# Patient Record
Sex: FEMALE | Race: BLACK OR AFRICAN AMERICAN | Employment: FULL TIME | URBAN - METROPOLITAN AREA
[De-identification: names, ages, dates, MRNs, and addresses within clinical notes are randomized per-mention and may not be internally consistent; named-entity substitution may affect disease eponyms.]

---

## 2024-02-10 LAB
HIV RESULT OB: NEGATIVE
RAPID PLASMA REAGIN OB: NONREACTIVE
RH FACTOR OB: POSITIVE

## 2024-04-25 NOTE — PROGRESS NOTES
Outpatient Maternal-Fetal Medicine Consultation    Dear Dr. Jauregui    Thank you for requesting ultrasound evaluation and maternal fetal medicine consultation on your patient Maryam Mderano.  As you are aware she is a 38 year old female  with a singletonpregnancy and an Estimated Date of Delivery: 2024.  A maternal-fetal medicine consultation was requested secondary to advanced maternal age.  Her prenatal records and labs were reviewed.    HISTORY  Past OB History  G1 P 0000    Past Medical History  unremarkable    Past Surgical History   - appendectomy    Family History  Noncontributory    Medications: PNV, fish Oil, Vit D  Allergies: NKDA    PHYSICAL EXAMINATION:  /74 (BP Location: Right arm, Patient Position: Sitting, Cuff Size: adult)   Pulse 89   Ht 5' 4\" (1.626 m)   Wt 136 lb (61.7 kg)   BMI 23.34 kg/m²   General: alert and oriented,no acute distress  Abdomen: gravid, soft, non-tender  Extremities: non-tender, no edema    OBSTETRIC ULTRASOUND    Ultrasound Findings:  Single IUP in cephalic presentation.    Placenta is anterior.   A 3 vessel cord is noted.  Cardiac activity is present at 143 bpm   g ( 0 lb 12 oz);   MVP is 5.5 cm .   Left Pyelectasis  The fetal measurements are consistent with the established EDC. No ultrasound evidence of structural abnormalities are seen today. The nasal bone is present. No ultrasound evidence of markers for aneuploidy are seen. She understands that ultrasound exam cannot exclude genetic abnormalities and that genetic testing is recommended. The limitations of ultrasound were discussed.     Uterus and adnexa appeared normal  today on US    See PACS/Imaging Tab For Complete Ultrasound Report  I interpreted the results and reviewed them with the patient.    DISCUSSION  During her visit we discussed and reviewed the following issues:  PYELECTASIS  Pyelectasis is defined as an abnormal dilation of the renal pelvis in the sabina-posterior  diameter.  The following values have been used to define pyelectasis:     >4mm at 15-26weeks  >7mm at >26 weeks    The incidence of pyelectasis in the general population is 2-3%.  The relationship between isolated pyelectasis and aneuploidy is unclear.  Anival (1992) gave a risk of aneuploidy of 1 in 340.  In patients with other risk factors, genetic testing is offered.  A detailed ultrasound is recommended in all cases.     A follow-up scan is advised in the third trimester (~ 32 weeks) to reassess the fetal  anatomy; specifically to rule out progressive renal pelvis dilatation.  Ureteral reflux is a common cause of pathologic renal pelvis dilatation; with more significant renal pelvis dilatation structural genitourinary abnormalities are more likely.  If progressive renal pelvis dilatation is noted at the follow-up scan, post- pediatric urology follow-up will be advised.    ADVANCED MATERNAL AGE    Background  I reviewed with the patient that pregnancies in women of advanced maternal age (35 or older at delivery) are associated with elevated risks.  Specifically, there is a higher rate of:    Fetal malformations  Preeclampsia  Gestational diabetes  Intrauterine fetal death    As a result, enhanced pregnancy surveillance is advised for these patients including a comprehensive ultrasound to assess for fetal malformations  (at 20 weeks) and a third trimester ultrasound assessment for fetal growth (at 32 weeks).  In addition, weekly NST's (initiating at 36 weeks gestation for women 35-39 years and at 32 weeks gestation for women 40 years and older) are also advised.  Routine obstetric care is more than adequate to assess for gestational diabetes and preeclampsia; hence, no further significant alterations in obstetric care are advised.    Fetal Aneuploidy    We also discussed the increased risk of chromosomal abnormalities associated with advanced maternal age.  I reviewed that an ultrasound examination  cannot reliably exclude potential genetic abnormalities. Given that the patient will be 38 years old at the time of delivery I reviewed that her risk (at delivery) of having a  with any chromosome abnormality is 1:83 and with trisomy 21 is 1: 136.    Invasive Testing    I offered invasive genetic testing (amniocentesis, chorionic villus sampling) after reviewing the diagnostic accuracy of these tests as well as the procedure associated loss rate (1:500 for genetic amniocentesis).    She ultimately does not desire invasive genetic testing.     Non-invasive Pregnancy Testing (NIPT)    I reviewed current non-invasive screening options.  Currently non-invasive pregnancy testing (NIPT) offers the highest detection rate (with the lowest false positive rate) for the detection of fetal aneuploidy amongst high-risk patients.  The limitations of detailed mid-trimester sonography was reviewed with the patient.  First trimester screening and second trimester multiple-marker serum serum screening as alternative aneuploidy screening options were also reviewed.  However, both of these tests are associated with lower detection and higher false positive rates.    The patient has already obtained a low-risk  NPIT result and was appropriately reassured.     IMPRESSION:  IUP at 20w1d  AMA: low-risk cell free fetal DNA, declined invasive testing  Left Pyelectasis - mild 5 mm    RECOMMENDATIONS:  Continue care with Dr. Jauregui  Follow-up growth & BPP ultrasound at 32 weeks.  Weekly NST's at 36 weeks.    Thank you for allowing me to participate in the care of your patient.  Please do not hesitate to contact me if additional questions or concerns arise.      Yovanny Feliciano M.D.    40 minutes spent in review of records, patient consultation, documentation and coordination of care.  The relevant clinical matter(s) are summarized above.     Note to patient and family  The 21st Century Cures Act makes medical notes available to  patients in the interest of transparency.  However, please be advised that this is a medical document.  It is intended as brys-xn-nxit communication.  It is written and medical language may contain abbreviations or verbiage that are technical and unfamiliar.  It may appear blunt or direct.  Medical documents are intended to carry relevant information, facts as evident, and the clinical opinion of the practitioner.

## 2024-05-02 ENCOUNTER — ULTRASOUND ENCOUNTER (OUTPATIENT)
Dept: PERINATAL CARE | Facility: HOSPITAL | Age: 39
End: 2024-05-02
Attending: OBSTETRICS & GYNECOLOGY
Payer: COMMERCIAL

## 2024-05-02 VITALS
HEIGHT: 64 IN | BODY MASS INDEX: 23.22 KG/M2 | SYSTOLIC BLOOD PRESSURE: 108 MMHG | WEIGHT: 136 LBS | DIASTOLIC BLOOD PRESSURE: 74 MMHG | HEART RATE: 89 BPM

## 2024-05-02 DIAGNOSIS — O09.529 AMA (ADVANCED MATERNAL AGE) MULTIGRAVIDA 35+ (HCC): ICD-10-CM

## 2024-05-02 DIAGNOSIS — O09.512 AMA (ADVANCED MATERNAL AGE) PRIMIGRAVIDA 35+, SECOND TRIMESTER (HCC): Primary | ICD-10-CM

## 2024-05-02 PROCEDURE — 76811 OB US DETAILED SNGL FETUS: CPT | Performed by: OBSTETRICS & GYNECOLOGY

## 2024-05-02 RX ORDER — PNV NO.95/FERROUS FUM/FOLIC AC 28MG-0.8MG
TABLET ORAL
COMMUNITY

## 2024-07-26 ENCOUNTER — ULTRASOUND ENCOUNTER (OUTPATIENT)
Dept: PERINATAL CARE | Facility: HOSPITAL | Age: 39
End: 2024-07-26
Attending: OBSTETRICS & GYNECOLOGY
Payer: COMMERCIAL

## 2024-07-26 VITALS
DIASTOLIC BLOOD PRESSURE: 69 MMHG | SYSTOLIC BLOOD PRESSURE: 109 MMHG | HEART RATE: 113 BPM | WEIGHT: 155 LBS | HEIGHT: 64 IN | BODY MASS INDEX: 26.46 KG/M2

## 2024-07-26 DIAGNOSIS — O35.EXX0 ENCOUNTER FOR REPEAT ULTRASOUND OF FETAL PYELECTASIS IN SINGLETON PREGNANCY, ANTEPARTUM (HCC): ICD-10-CM

## 2024-07-26 DIAGNOSIS — O09.529 AMA (ADVANCED MATERNAL AGE) MULTIGRAVIDA 35+ (HCC): ICD-10-CM

## 2024-07-26 DIAGNOSIS — O09.513 PRIMIGRAVIDA OF ADVANCED MATERNAL AGE IN THIRD TRIMESTER (HCC): ICD-10-CM

## 2024-07-26 DIAGNOSIS — O09.529 AMA (ADVANCED MATERNAL AGE) MULTIGRAVIDA 35+ (HCC): Primary | ICD-10-CM

## 2024-07-26 PROCEDURE — 76819 FETAL BIOPHYS PROFIL W/O NST: CPT

## 2024-07-26 PROCEDURE — 76816 OB US FOLLOW-UP PER FETUS: CPT | Performed by: OBSTETRICS & GYNECOLOGY

## 2024-07-26 NOTE — PROGRESS NOTES
Outpatient Maternal-Fetal Medicine Follow-Up     Dear Dr. Wells,     Thank you for requesting ultrasound evaluation and maternal fetal medicine consultation on your patient Maryam Medrano.  As you are aware she is a 38 year old female  with a [place] pregnancy and an Estimated Date of Delivery: 24.  She returned to maternal-fetal medicine today for a follow-up visit.  Her history was reviewed from her prior visit and there were no interval changes.     Antepartum Risk Factors  Advanced maternal age, primigravida  Pyelectasis noted at her level 2 ultrasound -resolved as of 2024      S: She reports good fetal movement.  She reports that she had screening  PHYSICAL EXAMINATION:  /69 (BP Location: Right arm, Patient Position: Sitting, Cuff Size: adult)   Pulse 113   Ht 5' 4\" (1.626 m)   Wt 155 lb (70.3 kg)   BMI 26.61 kg/m²   General: alert and oriented, no acute distress  Abdomen: gravid, soft, non-tender  Extremities: non-tender, no edema     OBSTETRIC ULTRASOUND  The patient had a fetal growth and BPP ultrasound today which I interpreted the results and reviewed them with the patient.    Ultrasound Findings:  Single IUP in cephalic presentation.    Placenta is posterior.   Cardiac activity is present at 139 bpm  EFW 1917 g ( 4 lb 4 oz); 44%.    MARIO is  12.6 cm.  MVP is 8.7 cm  BPP is 8/8.     Previously seen right pyelectasis has resolved.     The fetal measurements are consistent with established EDC. No gross ultrasound evidence of structural abnormalities are seen today. The patient understands that ultrasound cannot rule out all structural and chromosomal abnormalities.     See imaging tab for the complete US report.     DISCUSSION  During her visit we discussed and reviewed the following issues:  PYELECTASIS  Pyelectasis is defined as an abnormal dilation of the renal pelvis in the sabina-posterior diameter.  The following values have been used to define pyelectasis:      >4mm at  15-26weeks  >7mm at >26 weeks     The fetal kidneys appear normal today.  No additional surveillance is needed     ADVANCED MATERNAL AGE  Discussed previously and reviewed.  See [facility] note from 2024 for detailed review  I reviewed with the patient that pregnancies in women of advanced maternal age (35 or older at delivery) are associated with elevated risks.  Specifically, there is a higher rate of:     Fetal malformations  Preeclampsia  Gestational diabetes  Intrauterine fetal death     As a result, enhanced pregnancy surveillance is advised for these patients including a comprehensive ultrasound to assess for fetal malformations  (at 20 weeks) and a third trimester ultrasound assessment for fetal growth (at 32 weeks).  In addition, weekly NST's (initiating at 36 weeks gestation for women 35-39 years and at 32 weeks gestation for women 40 years and older) are also advised.  Routine obstetric care is more than adequate to assess for gestational diabetes and preeclampsia; hence, no further significant alterations in obstetric care are advised.    We reviewed the signs and symptoms of preeclampsia,  labor and monitoring fetal movement.  We discussed reasons for her to call her physician.       IMPRESSION:  IUP at 32w2d  Normal fetal growth and  testing  Prior finding of fetal pyelectasis has resolved  AMA: low-risk cell free fetal DNA, declined invasive testing       RECOMMENDATIONS:  Continue care with Dr. Wells  Weekly NST's at 36 weeks.        Total time spent 30 minutes this calendar day which includes preparing to see the patient including chart review, obtaining and/or reviewing additional medical history, performing a physical exam and evaluation, documenting clinical information in the electronic medical record, independently interpreting results, counseling the patient, communicating results to the patient/family/caregiver and coordinating care.     Case discussed with patient who  demonstrated understanding and agreement with plan.     Thank you for allowing me to participate in the care of this patient.  Please feel free to contact me with any questions.    Angella Pereira MD  Maternal-Fetal Medicine       Note to patient and family:  The 21st Century Cures Act makes medical notes available to patients in the interest of transparency.  However, please be advised that this is a medical document.  It is intended as a peer to peer communication.  It is written in medical language and may contain abbreviations or verbiage that are technical and unfamiliar.  It may appear blunt or direct.  Medical documents are intended to carry relevant information, facts as evident, and the clinical opinion of the practitioner.

## 2024-08-16 LAB
HIV RESULT OB: NEGATIVE
RAPID PLASMA REAGIN OB: NONREACTIVE
STREP GP B CULT OB: NEGATIVE

## 2024-09-04 ENCOUNTER — APPOINTMENT (OUTPATIENT)
Dept: ULTRASOUND IMAGING | Facility: HOSPITAL | Age: 39
End: 2024-09-04
Attending: OBSTETRICS & GYNECOLOGY
Payer: COMMERCIAL

## 2024-09-04 ENCOUNTER — HOSPITAL ENCOUNTER (OUTPATIENT)
Facility: HOSPITAL | Age: 39
Discharge: HOME | End: 2024-09-04
Attending: OBSTETRICS & GYNECOLOGY | Admitting: OBSTETRICS & GYNECOLOGY
Payer: COMMERCIAL

## 2024-09-04 VITALS
HEART RATE: 84 BPM | WEIGHT: 162 LBS | RESPIRATION RATE: 13 BRPM | SYSTOLIC BLOOD PRESSURE: 125 MMHG | HEIGHT: 64 IN | BODY MASS INDEX: 27.66 KG/M2 | DIASTOLIC BLOOD PRESSURE: 77 MMHG

## 2024-09-04 PROCEDURE — 76819 FETAL BIOPHYS PROFIL W/O NST: CPT | Performed by: OBSTETRICS & GYNECOLOGY

## 2024-09-04 PROCEDURE — 59025 FETAL NON-STRESS TEST: CPT

## 2024-09-04 PROCEDURE — 99213 OFFICE O/P EST LOW 20 MIN: CPT

## 2024-09-04 PROCEDURE — 76818 FETAL BIOPHYS PROFILE W/NST: CPT | Performed by: OBSTETRICS & GYNECOLOGY

## 2024-09-04 NOTE — PROGRESS NOTES
admitted to triage 2. Ambulated to room with  present. Pt to bathroom, gown on, pt to bed. Pt states has some lower abdominal discomfort that feels like pressure. Pt states it has decreased from a couple of hours ago. Pt denies lof or bleeding. Efm and toco applied. Initial assessment done.

## 2024-09-04 NOTE — PROGRESS NOTES
Written and verbal discharge instructions given. Questions answered. Labor precautions reviewed. Pt verbalized understanding. Pt ambulated off unit with  present. Pt alert and stable.

## 2024-09-04 NOTE — DISCHARGE INSTRUCTIONS
Discharge Instructions    Diet: Regular  Activity: Normal activity         General Instructions    Call your OB doctor if: Fluid leaking from your vagina;Decrease in fetal movement;Vaginal or rectal pressure;Vaginal bleeding;Uterine contractions 10 minutes or closer for 1 to 2 hours;Uterine contractions increasing in intensity and frequency;Temperature greater than 100F  Early labor comfort measures: Drink fluids and eat small light meals;Take a walk;Relax, sleep, take a warm bath or shower for 30 minutes or less

## 2024-09-04 NOTE — NST
Nonstress Test   Patient: Maryam Medrano    Gestation: 38w0d    NST:       Variability: Moderate           Accelerations: Yes           Decelerations: None            Baseline: 125 BPM           Uterine Irritability: No           Contractions: Irregular                                        Acoustic Stimulator: No           Nonstress Test Interpretation: Reactive           Nonstress Test Second Interpretation: Reactive                     Additional Comments

## 2024-09-13 ENCOUNTER — ANESTHESIA EVENT (OUTPATIENT)
Dept: OBGYN UNIT | Facility: HOSPITAL | Age: 39
End: 2024-09-13
Payer: COMMERCIAL

## 2024-09-13 ENCOUNTER — ANESTHESIA (OUTPATIENT)
Dept: OBGYN UNIT | Facility: HOSPITAL | Age: 39
End: 2024-09-13
Payer: COMMERCIAL

## 2024-09-13 ENCOUNTER — HOSPITAL ENCOUNTER (INPATIENT)
Facility: HOSPITAL | Age: 39
LOS: 3 days | Discharge: HOME | End: 2024-09-16
Attending: OBSTETRICS & GYNECOLOGY | Admitting: OBSTETRICS & GYNECOLOGY
Payer: COMMERCIAL

## 2024-09-13 PROBLEM — Z34.90 PREGNANCY (HCC): Status: ACTIVE | Noted: 2024-09-13

## 2024-09-13 LAB
ANTIBODY SCREEN: NEGATIVE
BASOPHILS # BLD AUTO: 0.03 X10(3) UL (ref 0–0.2)
BASOPHILS NFR BLD AUTO: 0.2 %
EOSINOPHIL # BLD AUTO: 0.02 X10(3) UL (ref 0–0.7)
EOSINOPHIL NFR BLD AUTO: 0.2 %
ERYTHROCYTE [DISTWIDTH] IN BLOOD BY AUTOMATED COUNT: 12.8 %
HBV SURFACE AG SER-ACNC: <0.1 [IU]/L
HBV SURFACE AG SERPL QL IA: NONREACTIVE
HCT VFR BLD AUTO: 37.4 %
HGB BLD-MCNC: 13.2 G/DL
IMM GRANULOCYTES # BLD AUTO: 0.14 X10(3) UL (ref 0–1)
IMM GRANULOCYTES NFR BLD: 1.1 %
LYMPHOCYTES # BLD AUTO: 1.12 X10(3) UL (ref 1–4)
LYMPHOCYTES NFR BLD AUTO: 8.4 %
MCH RBC QN AUTO: 33 PG (ref 26–34)
MCHC RBC AUTO-ENTMCNC: 35.3 G/DL (ref 31–37)
MCV RBC AUTO: 93.5 FL
MONOCYTES # BLD AUTO: 0.55 X10(3) UL (ref 0.1–1)
MONOCYTES NFR BLD AUTO: 4.1 %
NEUTROPHILS # BLD AUTO: 11.44 X10 (3) UL (ref 1.5–7.7)
NEUTROPHILS # BLD AUTO: 11.44 X10(3) UL (ref 1.5–7.7)
NEUTROPHILS NFR BLD AUTO: 86 %
PLATELET # BLD AUTO: 240 10(3)UL (ref 150–450)
RBC # BLD AUTO: 4 X10(6)UL
RH BLOOD TYPE: POSITIVE
T PALLIDUM AB SER QL IA: NONREACTIVE
WBC # BLD AUTO: 13.3 X10(3) UL (ref 4–11)

## 2024-09-13 PROCEDURE — 86900 BLOOD TYPING SEROLOGIC ABO: CPT | Performed by: OBSTETRICS & GYNECOLOGY

## 2024-09-13 PROCEDURE — 86780 TREPONEMA PALLIDUM: CPT | Performed by: OBSTETRICS & GYNECOLOGY

## 2024-09-13 PROCEDURE — 86850 RBC ANTIBODY SCREEN: CPT | Performed by: OBSTETRICS & GYNECOLOGY

## 2024-09-13 PROCEDURE — 86901 BLOOD TYPING SEROLOGIC RH(D): CPT | Performed by: OBSTETRICS & GYNECOLOGY

## 2024-09-13 PROCEDURE — 99214 OFFICE O/P EST MOD 30 MIN: CPT

## 2024-09-13 PROCEDURE — 87340 HEPATITIS B SURFACE AG IA: CPT | Performed by: OBSTETRICS & GYNECOLOGY

## 2024-09-13 PROCEDURE — 85025 COMPLETE CBC W/AUTO DIFF WBC: CPT | Performed by: OBSTETRICS & GYNECOLOGY

## 2024-09-13 RX ORDER — NALBUPHINE HYDROCHLORIDE 10 MG/ML
2.5 INJECTION, SOLUTION INTRAMUSCULAR; INTRAVENOUS; SUBCUTANEOUS
Status: DISCONTINUED | OUTPATIENT
Start: 2024-09-13 | End: 2024-09-16

## 2024-09-13 RX ORDER — LIDOCAINE HYDROCHLORIDE AND EPINEPHRINE 15; 5 MG/ML; UG/ML
INJECTION, SOLUTION EPIDURAL AS NEEDED
Status: DISCONTINUED | OUTPATIENT
Start: 2024-09-13 | End: 2024-09-14 | Stop reason: SURG

## 2024-09-13 RX ORDER — BUPIVACAINE HCL/0.9 % NACL/PF 0.25 %
5 PLASTIC BAG, INJECTION (ML) EPIDURAL AS NEEDED
Status: DISCONTINUED | OUTPATIENT
Start: 2024-09-13 | End: 2024-09-16

## 2024-09-13 RX ORDER — ONDANSETRON 2 MG/ML
4 INJECTION INTRAMUSCULAR; INTRAVENOUS EVERY 6 HOURS PRN
Status: DISCONTINUED | OUTPATIENT
Start: 2024-09-13 | End: 2024-09-14 | Stop reason: HOSPADM

## 2024-09-13 RX ORDER — ACETAMINOPHEN 500 MG
1000 TABLET ORAL EVERY 6 HOURS PRN
Status: DISCONTINUED | OUTPATIENT
Start: 2024-09-13 | End: 2024-09-14 | Stop reason: HOSPADM

## 2024-09-13 RX ORDER — ACETAMINOPHEN 500 MG
500 TABLET ORAL EVERY 6 HOURS PRN
Status: DISCONTINUED | OUTPATIENT
Start: 2024-09-13 | End: 2024-09-14 | Stop reason: HOSPADM

## 2024-09-13 RX ORDER — DEXTROSE, SODIUM CHLORIDE, SODIUM LACTATE, POTASSIUM CHLORIDE, AND CALCIUM CHLORIDE 5; .6; .31; .03; .02 G/100ML; G/100ML; G/100ML; G/100ML; G/100ML
INJECTION, SOLUTION INTRAVENOUS AS NEEDED
Status: DISCONTINUED | OUTPATIENT
Start: 2024-09-13 | End: 2024-09-14 | Stop reason: HOSPADM

## 2024-09-13 RX ORDER — CITRIC ACID/SODIUM CITRATE 334-500MG
30 SOLUTION, ORAL ORAL AS NEEDED
Status: DISCONTINUED | OUTPATIENT
Start: 2024-09-13 | End: 2024-09-14 | Stop reason: HOSPADM

## 2024-09-13 RX ORDER — ROPIVACAINE HYDROCHLORIDE 5 MG/ML
INJECTION, SOLUTION EPIDURAL; INFILTRATION; PERINEURAL AS NEEDED
Status: DISCONTINUED | OUTPATIENT
Start: 2024-09-13 | End: 2024-09-14 | Stop reason: SURG

## 2024-09-13 RX ORDER — SODIUM CHLORIDE, SODIUM LACTATE, POTASSIUM CHLORIDE, CALCIUM CHLORIDE 600; 310; 30; 20 MG/100ML; MG/100ML; MG/100ML; MG/100ML
INJECTION, SOLUTION INTRAVENOUS CONTINUOUS
Status: DISCONTINUED | OUTPATIENT
Start: 2024-09-13 | End: 2024-09-14 | Stop reason: HOSPADM

## 2024-09-13 RX ORDER — IBUPROFEN 600 MG/1
600 TABLET, FILM COATED ORAL ONCE AS NEEDED
Status: DISCONTINUED | OUTPATIENT
Start: 2024-09-13 | End: 2024-09-14 | Stop reason: HOSPADM

## 2024-09-13 RX ORDER — TERBUTALINE SULFATE 1 MG/ML
0.25 INJECTION, SOLUTION SUBCUTANEOUS AS NEEDED
Status: DISCONTINUED | OUTPATIENT
Start: 2024-09-13 | End: 2024-09-14 | Stop reason: HOSPADM

## 2024-09-13 RX ADMIN — LIDOCAINE HYDROCHLORIDE AND EPINEPHRINE 3 ML: 15; 5 INJECTION, SOLUTION EPIDURAL at 20:22:00

## 2024-09-13 RX ADMIN — ROPIVACAINE HYDROCHLORIDE 5 ML: 5 INJECTION, SOLUTION EPIDURAL; INFILTRATION; PERINEURAL at 20:24:00

## 2024-09-13 NOTE — PLAN OF CARE
Problem: Patient/Family Goals  Goal: Patient/Family Long Term Goal  Description: Patient's Long Term Goal: to have an uncomplicated delivery    Interventions:  -   - See additional Care Plan goals for specific interventions  Outcome: Progressing  Goal: Patient/Family Short Term Goal  Description: Patient's Short Term Goal: to have adequate pain relief    Interventions:   -   - See additional Care Plan goals for specific interventions  Outcome: Progressing     Problem: BIRTH - VAGINAL/ SECTION  Goal: Fetal and maternal status remain reassuring during the birth process  Description: INTERVENTIONS:  - Monitor vital signs  - Monitor fetal heart rate  - Monitor uterine activity  - Monitor labor progression (vaginal delivery)  - DVT prophylaxis (C/S delivery)  - Surgical antibiotic prophylaxis (C/S delivery)  Outcome: Progressing     Problem: PAIN - ADULT  Goal: Verbalizes/displays adequate comfort level or patient's stated pain goal  Description: INTERVENTIONS:  - Encourage pt to monitor pain and request assistance  - Assess pain using appropriate pain scale  - Administer analgesics based on type and severity of pain and evaluate response  - Implement non-pharmacological measures as appropriate and evaluate response  - Consider cultural and social influences on pain and pain management  - Manage/alleviate anxiety  - Utilize distraction and/or relaxation techniques  - Monitor for opioid side effects  - Notify MD/LIP if interventions unsuccessful or patient reports new pain  - Anticipate increased pain with activity and pre-medicate as appropriate  Outcome: Progressing     Problem: ANXIETY  Goal: Will report anxiety at manageable levels  Description: INTERVENTIONS:  - Administer medication as ordered  - Teach and rehearse alternative coping skills  - Provide emotional support with 1:1 interaction with staff  Outcome: Progressing

## 2024-09-13 NOTE — PROGRESS NOTES
with edc 24 (39 week 2 day gestation) presents with c/o u/c's all day, getting stronger at 0930 today. States was in the office this am and sve by NP was 4cm. Pt breathing with u/c's, s/o supportive at side. Denies srom. Aware of some spotting since sve earlier.

## 2024-09-14 LAB — RH BLOOD TYPE: POSITIVE

## 2024-09-14 PROCEDURE — 0KQM0ZZ REPAIR PERINEUM MUSCLE, OPEN APPROACH: ICD-10-PCS | Performed by: OBSTETRICS & GYNECOLOGY

## 2024-09-14 PROCEDURE — 10907ZC DRAINAGE OF AMNIOTIC FLUID, THERAPEUTIC FROM PRODUCTS OF CONCEPTION, VIA NATURAL OR ARTIFICIAL OPENING: ICD-10-PCS | Performed by: OBSTETRICS & GYNECOLOGY

## 2024-09-14 RX ORDER — ACETAMINOPHEN 500 MG
500 TABLET ORAL EVERY 6 HOURS PRN
Status: DISCONTINUED | OUTPATIENT
Start: 2024-09-14 | End: 2024-09-16

## 2024-09-14 RX ORDER — HYDROCODONE BITARTRATE AND ACETAMINOPHEN 5; 325 MG/1; MG/1
1 TABLET ORAL EVERY 6 HOURS PRN
Status: DISCONTINUED | OUTPATIENT
Start: 2024-09-14 | End: 2024-09-16

## 2024-09-14 RX ORDER — ACETAMINOPHEN 500 MG
1000 TABLET ORAL EVERY 6 HOURS PRN
Status: DISCONTINUED | OUTPATIENT
Start: 2024-09-14 | End: 2024-09-16

## 2024-09-14 RX ORDER — AMMONIA INHALANTS 0.04 G/.3ML
0.3 INHALANT RESPIRATORY (INHALATION) AS NEEDED
Status: DISCONTINUED | OUTPATIENT
Start: 2024-09-14 | End: 2024-09-16

## 2024-09-14 RX ORDER — DOCUSATE SODIUM 100 MG/1
100 CAPSULE, LIQUID FILLED ORAL
Status: DISCONTINUED | OUTPATIENT
Start: 2024-09-14 | End: 2024-09-16

## 2024-09-14 RX ORDER — BISACODYL 10 MG
10 SUPPOSITORY, RECTAL RECTAL ONCE AS NEEDED
Status: DISCONTINUED | OUTPATIENT
Start: 2024-09-14 | End: 2024-09-16

## 2024-09-14 RX ORDER — IBUPROFEN 600 MG/1
600 TABLET, FILM COATED ORAL EVERY 6 HOURS
Status: DISCONTINUED | OUTPATIENT
Start: 2024-09-14 | End: 2024-09-16

## 2024-09-14 RX ORDER — SIMETHICONE 80 MG
80 TABLET,CHEWABLE ORAL 3 TIMES DAILY PRN
Status: DISCONTINUED | OUTPATIENT
Start: 2024-09-14 | End: 2024-09-16

## 2024-09-14 RX ORDER — TRANEXAMIC ACID 10 MG/ML
INJECTION, SOLUTION INTRAVENOUS
Status: COMPLETED
Start: 2024-09-14 | End: 2024-09-14

## 2024-09-14 NOTE — ANESTHESIA PROCEDURE NOTES
Labor Analgesia    Date/Time: 9/13/2024 8:08 PM    Performed by: Jameson Flor MD  Authorized by: Jameson Flor MD      General Information and Staff    Start Time:  9/13/2024 8:08 PM  End Time:  9/13/2024 8:21 PM  Anesthesiologist:  Jameson Flor MD  Performed by:  Anesthesiologist  Patient Location:  OB  Site Identification: surface landmarks    Reason for Block: labor epidural    Preanesthetic Checklist: patient identified, IV checked, risks and benefits discussed, monitors and equipment checked, pre-op evaluation, timeout performed, IV bolus, anesthesia consent and sterile technique used      Procedure Details    Patient Position:  Sitting  Prep: ChloraPrep    Monitoring:  Heart rate and continuous pulse ox  Approach:  Midline    Epidural Needle    Injection Technique:   Needle Type:  Tuohy  Needle Gauge:  17 G  Needle Length:  3.375 in  Location:  L3-4    Spinal Needle    Needle Type:   Needle Insertion Depth:  6    Catheter    Catheter Type:  End hole  Catheter Size:  19 G  Catheter at Skin Depth:  14  Test Dose:  Negative    Assessment      Additional Comments     Patient in sitting position with ASA monitors on.  Lumbar area prepped and draped in sterile fashion.  Lido 1% skin infiltration at L3-L4 interspace.  17G Touhy epidural needle advanced midline at L3-L4 interspace into epidural space using LIZZETTE technique with saline.  Catheter threaded easily.  After negative aspirate, test dose given as noted and was negative.  Catheter taped and secured.  Epidural bolus given as noted, and infusion begun.  No heme or paresthesias noted throughout.  Patient tolerated procedure well without any apparent complications.

## 2024-09-14 NOTE — PLAN OF CARE
Problem: Patient/Family Goals  Goal: Patient/Family Long Term Goal  Description: Patient's Long Term Goal: to have an uncomplicated delivery    Interventions:  -   - See additional Care Plan goals for specific interventions  2024 by Alexandre Adame RN  Outcome: Completed  2024 by Alexandre Adame RN  Outcome: Progressing  Goal: Patient/Family Short Term Goal  Description: Patient's Short Term Goal: to have adequate pain relief    Interventions:   -   - See additional Care Plan goals for specific interventions  2024 by Alexandre Adame RN  Outcome: Completed  2024 by Alexandre Adame RN  Outcome: Progressing     Problem: BIRTH - VAGINAL/ SECTION  Goal: Fetal and maternal status remain reassuring during the birth process  Description: INTERVENTIONS:  - Monitor vital signs  - Monitor fetal heart rate  - Monitor uterine activity  - Monitor labor progression (vaginal delivery)  - DVT prophylaxis (C/S delivery)  - Surgical antibiotic prophylaxis (C/S delivery)  2024 by Alexandre Adame RN  Outcome: Completed  2024 by Alexandre Adame RN  Outcome: Progressing     Problem: PAIN - ADULT  Goal: Verbalizes/displays adequate comfort level or patient's stated pain goal  Description: INTERVENTIONS:  - Encourage pt to monitor pain and request assistance  - Assess pain using appropriate pain scale  - Administer analgesics based on type and severity of pain and evaluate response  - Implement non-pharmacological measures as appropriate and evaluate response  - Consider cultural and social influences on pain and pain management  - Manage/alleviate anxiety  - Utilize distraction and/or relaxation techniques  - Monitor for opioid side effects  - Notify MD/LIP if interventions unsuccessful or patient reports new pain  - Anticipate increased pain with activity and pre-medicate as appropriate  2024 by Alexandre Aadme RN  Outcome: Completed  2024 by  Alexandre Adame, RN  Outcome: Progressing     Problem: ANXIETY  Goal: Will report anxiety at manageable levels  Description: INTERVENTIONS:  - Administer medication as ordered  - Teach and rehearse alternative coping skills  - Provide emotional support with 1:1 interaction with staff  9/14/2024 0628 by Alexandre Adame, RN  Outcome: Completed  9/13/2024 2100 by Alexandre Adame, RN  Outcome: Progressing     Problem: SAFETY ADULT - FALL  Goal: Free from fall injury  Description: INTERVENTIONS:  - Assess pt frequently for physical needs  - Identify cognitive and physical deficits and behaviors that affect risk of falls.  - [place] fall precautions as indicated by assessment.  - Educate pt/family on patient safety including physical limitations  - Instruct pt to call for assistance with activity based on assessment  - Modify environment to reduce risk of injury  - Provide assistive devices as appropriate  - Consider OT/PT consult to assist with strengthening/mobility  - Encourage toileting schedule  9/14/2024 0628 by Alexandre Adame, RN  Outcome: Completed  9/13/2024 2100 by Alexandre Adame, RN  Outcome: Progressing

## 2024-09-14 NOTE — PROGRESS NOTES
NURSING ADMISSION NOTE      Patient admitted via Wheelchair  Oriented to room.  Safety precautions initiated.  Bed in low position.  Call light in reach.  Both mom/ infant ID bands verified, hugs on, teaching initiated.

## 2024-09-14 NOTE — H&P
OB H&P  38 year old  39w2d with Estimated Date of Delivery: 24.    HPI: Pt presents for latent labor.  She states that she has been nba all day and worsened this afternoon.  She was found to make change after several hours in triage and was admitted for labor.  Pt denies leaking of fluid or vaginal bleeding.  Reports fetal movement.  Patient began prenatal care with [facility] in the 2nd trimester.      Prenatal Complications:   Patient Active Problem List   Diagnosis    AMA (advanced maternal age) primigravida 35+, second trimester ([facility]) [O09.512]    Pregnancy ([facility])       ROS: Patient denies n/v, f/c, HA/dizziness, epigastric pain, visual changes, leg pain, cp/sob.     Prenatal Labs:   Blood type O positive  Rubella immune  VDRL NR  HBsAg unknown   HIV neg  GC/CT neg  GBS neg      CBC  No results for input(s): \"RBC\", \"HGB\", \"HCT\", \"MCV\", \"MCH\", \"MCHC\", \"RDW\", \"NEPRELIM\", \"WBC\", \"PLT\" in the last 168 hours.        POBHx:   OB History    Para Term  AB Living   1             SAB IAB Ectopic Multiple Live Births                  # Outcome Date GA Lbr Saul/2nd Weight Sex Type Anes PTL Lv   1 Current                GynHx: Denies any STDs, denies abnormal paps     PMHx: History reviewed. No pertinent past medical history.    Meds: No current outpatient medications on file.    Allergy: No Known Allergies    SurgHx: History reviewed. No pertinent surgical history.    SocHx:   Social History     Socioeconomic History    Marital status:    Tobacco Use    Smoking status: Never    Smokeless tobacco: Never   Vaping Use    Vaping status: Never Used   Substance and Sexual Activity    Alcohol use: Not Currently    Drug use: Never     Social Determinants of Health     Financial Resource Strain: Low Risk  (2024)    Financial Resource Strain     Difficulty of Paying Living Expenses: Not hard at all     Med Affordability: No   Food Insecurity: No Food Insecurity (2024)    Food Insecurity      Food Insecurity: Never true   Transportation Needs: No Transportation Needs (2024)    Transportation Needs     Lack of Transportation: No     Car Seat: Yes   Stress: No Stress Concern Present (2024)    Stress     Feeling of Stress : No   Housing Stability: Low Risk  (2024)    Housing Stability     Housing Instability: No     Crib or Bassinette: Yes       FamHx: History reviewed. No pertinent family history.    O:    Vitals:    24 1447   BP: 127/81   Pulse: 88   Resp: 18   Temp: 98.3 °F (36.8 °C)       GEN: A&OX3, WDWN, NAD   HEENT: NC/AT, Anicteric   CV: RRR +S1, +S2, no m/g/r   PULM:CTABL, no w/r/r   ABD: Gravid, soft, nontender to palpation, +BSX4   EXT: No edema, Neg Ton's Sign, nontender to palpation     FHT: 135 baseline, mod variability, + accels, - decels   [place]: q 3-6 min   SVE: 3/100/-2  SSE: Deferred     A/P:   38 year old  @ 39w2d here for latent labor     - Admit to L+D   - CBC, T&S   - IV Fluids= LR @ 125cc/hr  - CEFM/TOCO   - GBS neg  - Fentanyl/epidural PRN  - Expectant management   - Order HbsAg  Sidney Martino DO

## 2024-09-14 NOTE — ANESTHESIA PREPROCEDURE EVALUATION
PRE-OP EVALUATION    Patient Name: Maryam Medrano    Admit Diagnosis: pregnancy  Pregnancy (HCC)    Pre-op Diagnosis: * No surgery found *        Anesthesia Procedure: LABOR ANALGESIA    * Surgery not found *    Pre-op vitals reviewed.  Temp: 98.1 °F (36.7 °C)  Pulse: 91  Resp: 18  BP: 121/84     Body mass index is 28.15 kg/m².    Current medications reviewed.  Hospital Medications:   lactated ringers infusion   Intravenous Continuous    dextrose in lactated ringers 5% infusion   Intravenous PRN    lactated ringers IV bolus 500 mL  500 mL Intravenous PRN    acetaminophen (Tylenol Extra Strength) tab 500 mg  500 mg Oral Q6H PRN    acetaminophen (Tylenol Extra Strength) tab 1,000 mg  1,000 mg Oral Q6H PRN    ibuprofen (Motrin) tab 600 mg  600 mg Oral Once PRN    ondansetron (Zofran) 4 MG/2ML injection 4 mg  4 mg Intravenous Q6H PRN    oxyTOCIN in sodium chloride 0.9% (Pitocin) 30 Units/500mL infusion premix  62.5-900 tomi-units/min Intravenous Continuous    terbutaline (Brethine) 1 MG/ML injection 0.25 mg  0.25 mg Subcutaneous PRN    sodium citrate-citric acid (Bicitra) 500-334 MG/5ML oral solution 30 mL  30 mL Oral PRN    lactated ringers IV bolus 1,000 mL  1,000 mL Intravenous Once    fentaNYL-bupivacaine 2 mcg/mL-0.125% in sodium chloride 0.9% 100 mL EPIDURAL infusion premix  12 mL/hr Epidural Continuous    fentaNYL (Sublimaze) 50 mcg/mL injection 100 mcg  100 mcg Epidural Once    EPHEDrine (PF) 25 MG/5 ML injection 5 mg  5 mg Intravenous PRN    nalbuphine (Nubain) 10 mg/mL injection 2.5 mg  2.5 mg Intravenous Q15 Min PRN       Outpatient Medications:     Medications Prior to Admission   Medication Sig Dispense Refill Last Dose    Prenatal Vit-Fe Fumarate-FA (PRENATAL VITAMIN) 27-0.8 MG Oral Tab Take by mouth.   9/12/2024 at 1000    Omega-3 Fatty Acids (FISH OIL OR) Take by mouth.   9/12/2024 at 1000    Cholecalciferol 125 MCG (5000 UT) Oral Tab Take 1 tablet (5,000 Units total) by mouth daily.   9/12/2024 at  1000       Allergies: Patient has no known allergies.      Anesthesia Evaluation        Anesthetic Complications           GI/Hepatic/Renal    Negative GI/hepatic/renal ROS.                             Cardiovascular    Negative cardiovascular ROS.    Exercise tolerance: good     MET: >4                                           Endo/Other    Negative endo/other ROS.                              Pulmonary    Negative pulmonary ROS.                       Neuro/Psych    Negative neuro/psych ROS.                                  History reviewed. No pertinent surgical history.  Social History     Socioeconomic History    Marital status:    Tobacco Use    Smoking status: Never    Smokeless tobacco: Never   Vaping Use    Vaping status: Never Used   Substance and Sexual Activity    Alcohol use: Not Currently    Drug use: Never     History   Drug Use Unknown     Available pre-op labs reviewed.  Lab Results   Component Value Date    WBC 13.3 (H) 09/13/2024    RBC 4.00 09/13/2024    HGB 13.2 09/13/2024    HCT 37.4 09/13/2024    MCV 93.5 09/13/2024    MCH 33.0 09/13/2024    MCHC 35.3 09/13/2024    RDW 12.8 09/13/2024    .0 09/13/2024               Airway      Mallampati: II  Mouth opening: >3 FB  TM distance: 4 - 6 cm  Neck ROM: full Cardiovascular    Cardiovascular exam normal.         Dental             Pulmonary    Pulmonary exam normal.                 Other findings  Dentition grossly normal.            ASA: 2   Plan: epidural  NPO status verified and patient meets guidelines.          Plan/risks discussed with: patient                Present on Admission:  **None**

## 2024-09-14 NOTE — PLAN OF CARE
Problem: Patient/Family Goals  Goal: Patient/Family Long Term Goal  Description: Patient's Long Term Goal: to have an uncomplicated delivery    Interventions:  -   - See additional Care Plan goals for specific interventions  Outcome: Progressing  Goal: Patient/Family Short Term Goal  Description: Patient's Short Term Goal: to have adequate pain relief    Interventions:   -   - See additional Care Plan goals for specific interventions  Outcome: Progressing     Problem: BIRTH - VAGINAL/ SECTION  Goal: Fetal and maternal status remain reassuring during the birth process  Description: INTERVENTIONS:  - Monitor vital signs  - Monitor fetal heart rate  - Monitor uterine activity  - Monitor labor progression (vaginal delivery)  - DVT prophylaxis (C/S delivery)  - Surgical antibiotic prophylaxis (C/S delivery)  Outcome: Progressing     Problem: PAIN - ADULT  Goal: Verbalizes/displays adequate comfort level or patient's stated pain goal  Description: INTERVENTIONS:  - Encourage pt to monitor pain and request assistance  - Assess pain using appropriate pain scale  - Administer analgesics based on type and severity of pain and evaluate response  - Implement non-pharmacological measures as appropriate and evaluate response  - Consider cultural and social influences on pain and pain management  - Manage/alleviate anxiety  - Utilize distraction and/or relaxation techniques  - Monitor for opioid side effects  - Notify MD/LIP if interventions unsuccessful or patient reports new pain  - Anticipate increased pain with activity and pre-medicate as appropriate  Outcome: Progressing     Problem: ANXIETY  Goal: Will report anxiety at manageable levels  Description: INTERVENTIONS:  - Administer medication as ordered  - Teach and rehearse alternative coping skills  - Provide emotional support with 1:1 interaction with staff  Outcome: Progressing     Problem: SAFETY ADULT - FALL  Goal: Free from fall injury  Description:  INTERVENTIONS:  - Assess pt frequently for physical needs  - Identify cognitive and physical deficits and behaviors that affect risk of falls.  - [place] fall precautions as indicated by assessment.  - Educate pt/family on patient safety including physical limitations  - Instruct pt to call for assistance with activity based on assessment  - Modify environment to reduce risk of injury  - Provide assistive devices as appropriate  - Consider OT/PT consult to assist with strengthening/mobility  - Encourage toileting schedule  Outcome: Progressing

## 2024-09-14 NOTE — L&D DELIVERY NOTE
Jun Medrano [[id number]]      Labor Events     labor?: No   steroids?: None  Antibiotics received during labor?: No  Rupture date/time: 2024 0256     Rupture type: AROM  Fluid color: Clear  Labor type: Spontaneous Onset of Labor  Augmentation: None  Intrapartum & labor complications: None       Labor Length    1st stage: 9h 53m  2nd stage: 0h 46m  3rd stage: 0h 03m       Labor Event Times    Labor onset date/time: 2024 1830  Dilation complete date/time: 2024  Start pushing date/time: 2024       [place] Presentation    Presentation: Vertex  Position: Right Occiput Anterior       Operative Delivery    Operative Vaginal Delivery: No                Shoulder Dystocia    Shoulder Dystocia: No       Anesthesia    Method: Epidural               Delivery      Delivery date/time:  24 05:09:08   Delivery type: Normal spontaneous vaginal delivery    Details:     Delivery location: delivery room       Delivery Providers    Delivering Clinician: Sidney Martino DO   Delivery personnel:  Provider Role   Abeba Sorenson, RN Baby Nurse   Alexandre Adame RN Delivery Nurse             Cord    Vessels: 3 Vessels  Complications: None  Timed cord clamping: Yes  Time in sec: 60  Cord blood disposition: to lab  Gases sent?: Yes       Resuscitation    Method: None       [place] Measurements      Weight: 2820 g 6 lb 3.5 oz Length: 50.8 cm     Head circum.: 31.5 cm Chest circum.: 31.5 cm      Abdominal circum.: 29.3 cm           Placenta    Date/time: 2024 0513  Removal: Spontaneous  Appearance: Intact  Disposition: held for future pathology       Apgars    Living status: Living   Apgar Scoring Key:    0 1 2    Skin color Blue or pale Acrocyanotic Completely pink    Heart rate Absent <100 bpm >100 bpm    Reflex irritability No response Grimace Cry or active withdrawal    Muscle tone Limp Some flexion Active motion    Respiratory effort Absent Weak cry; hypoventilation  Good, crying              1 Minute:  5 Minute:  10 Minute:  15 Minute:  20 Minute:      Skin color: 1  1       Heart rate: 2  2       Reflex irritablity: 2  2       Muscle tone: 2  2       Respiratory effort: 2  2       Total: 9  9          Apgars assigned by: MADDIE ADAME RN       Skin to Skin    Skin to skin initiated date/time: 2024 0520  Skin to skin with: Mother       Vaginal Count    Initial count RN: Alexandre Adame RN  Initial count Tech: Christian, Duncan   Sponges   Sharps    Initial counts 11   0    Final counts 11   1    Final count RN: Alexandre Adame RN  Final count MD: Sidney Martino, DO       Lacerations    Episiotomy: None  Perineal lacerations: 2nd Repaired?: Yes     Vaginal laceration?: No      Cervical laceration?: No    Clitoral laceration?: No    Estimated blood loss (mL): 300            [facility]   [facility] of [facility]    Vaginal Delivery Note    Maryam Medrano Patient Status:  Inpatient    10/8/1985 MRN [id number]   Location [facility] LABOR & DELIVERY Attending Sidney Martino, DO   Hosp Day # 1 PCP No primary care provider on file.     Delivery     Infant  Date of Delivery: 2024    Time of Delivery: 5:09 AM   Delivery Type: Normal spontaneous vaginal delivery     Infant Sex/Birthweight: male 6 lb 3.5 oz (2.82 kg)     Presentation Vertex [1]   Position Right [3]  Occiput [1]  Anterior [1]     Apgars:  1 minute: 9                5 minutes: 9                         10 minutes:      Placenta  Date/Time of Delivery: 2024  5:13 AM    Delivery: spontaneous  Placenta to Pathology: no  Cord Gases Submitted: yes  Cord Blood Collection: yes  Cord Tissue Collection: no  Cord Complications: none  Sponge and Needle Counts:  Verified yes    Maternal Anesthesia: epidural   Episiotomy/Laceration Repair  Laceration: perineal 2nd degree    Delivery Complications  none    Neonatologist Present: no  Delivery Comment: Normal     Intake/Output   EBL:  213 ml    Sidney BAUTISTA  DO [place]   9/14/2024  5:34 AM

## 2024-09-14 NOTE — PROGRESS NOTES
Patient up to bathroom with assist x 2.  Voided without difficuly. Patient transferred to mother/baby room per wheelchair in stable condition with baby and personal belongings.  Accompanied by significant other and staff.  Report given to mother/baby RN Lindsay.

## 2024-09-15 LAB
BASOPHILS # BLD AUTO: 0.04 X10(3) UL (ref 0–0.2)
BASOPHILS NFR BLD AUTO: 0.3 %
EOSINOPHIL # BLD AUTO: 0.21 X10(3) UL (ref 0–0.7)
EOSINOPHIL NFR BLD AUTO: 1.5 %
ERYTHROCYTE [DISTWIDTH] IN BLOOD BY AUTOMATED COUNT: 12.9 %
HCT VFR BLD AUTO: 29.4 %
HGB BLD-MCNC: 9.9 G/DL
IMM GRANULOCYTES # BLD AUTO: 0.14 X10(3) UL (ref 0–1)
IMM GRANULOCYTES NFR BLD: 1 %
LYMPHOCYTES # BLD AUTO: 1.8 X10(3) UL (ref 1–4)
LYMPHOCYTES NFR BLD AUTO: 13 %
MCH RBC QN AUTO: 33 PG (ref 26–34)
MCHC RBC AUTO-ENTMCNC: 33.7 G/DL (ref 31–37)
MCV RBC AUTO: 98 FL
MONOCYTES # BLD AUTO: 0.81 X10(3) UL (ref 0.1–1)
MONOCYTES NFR BLD AUTO: 5.8 %
NEUTROPHILS # BLD AUTO: 10.86 X10 (3) UL (ref 1.5–7.7)
NEUTROPHILS # BLD AUTO: 10.86 X10(3) UL (ref 1.5–7.7)
NEUTROPHILS NFR BLD AUTO: 78.4 %
PLATELET # BLD AUTO: 187 10(3)UL (ref 150–450)
RBC # BLD AUTO: 3 X10(6)UL
WBC # BLD AUTO: 13.9 X10(3) UL (ref 4–11)

## 2024-09-15 PROCEDURE — 85025 COMPLETE CBC W/AUTO DIFF WBC: CPT | Performed by: OBSTETRICS & GYNECOLOGY

## 2024-09-15 NOTE — PROGRESS NOTES
Labor Analgesia Follow Up Note    Patient underwent epidural anesthesia for labor analgesia,    Placenta Date/Time: 9/14/2024  5:13 AM     Delivery Date/Time:: 9/14/2024   5:09 AM     /79 (BP Location: Right arm)   Pulse 75   Temp 97.8 °F (36.6 °C) (Oral)   Resp 18   Ht 1.626 m (5' 4\")   Wt 74.4 kg (164 lb)   SpO2 100%   Breastfeeding Yes   BMI 28.15 kg/m²     Assessment:  Patient seen and no apparent anesthesia related complications.    Thank you for asking us to participate in the care of your patient.    Gume Silvestre MD   [facility]

## 2024-09-15 NOTE — PROGRESS NOTES
OB Postpartum Progress Note    Subjective:  Patient seen and examined at bedside this AM. No acute events overnight. Pain well controlled with PO meds. Tolerating diet without N/V. Ambulating without difficulty. Voiding spontaneously without hematuria/dysuria.  Pt remains afebrile and denies chills/sweats. Denies HA/dizziness/vision changes/CP/palpitations/dyspnea/fevers/chills. Breast feeding. Lochia stable. No questions/concerns expressed at this time.       Objective:  VITALS:  [unfilled]    PHYSICAL EXAMINATION:  Gen:  AOx3, NAD, resting comfortably in bed  HEENT: NC/AT  Pulm: CTAB, no wheezes, rhonchi, or rales  CV:  RRR, normal S1/S2, no murmurs, rubs or gallops  Abd: Soft/NT/nondistended, + normoactive BS, fundus firm and below umbilicus  Ext:  No C/C/E, no calf tenderness  Neuro: Alert, CNs II-XII grossly intact  Skin/Lines: warm, dry, no rashes, no ulcerations     LABS/IMAGING:  CBC  Recent Labs   Lab 24  1950   RBC 4.00   HGB 13.2   HCT 37.4   MCV 93.5   MCH 33.0   MCHC 35.3   RDW 12.8   NEPRELIM 11.44*   WBC 13.3*   .0           MEDICATIONS:  No current outpatient medications on file.         Assessment/ Plan:    Maryam Medrano is a 38 year old  PPD# 1 s/p     VSS, meeting postpartum milestones    - Continue routine postpartum orders and nursing care  - FEN: General diet, HLIV  - Pain: PO tylenol and motrin  - GI: Colace  - Heme: Lochia stable, Hbg 13.2 on admission- CBC pending this am  - : Voiding  - PPx:  Increase activity and ambulation, encouraged breast feeding  - DISPO: CPM.  Plan for DC to home tomorrow      Sidney Martino DO

## 2024-09-16 VITALS
BODY MASS INDEX: 28 KG/M2 | RESPIRATION RATE: 14 BRPM | HEART RATE: 83 BPM | OXYGEN SATURATION: 100 % | HEIGHT: 64 IN | DIASTOLIC BLOOD PRESSURE: 73 MMHG | WEIGHT: 164 LBS | TEMPERATURE: 98 F | SYSTOLIC BLOOD PRESSURE: 117 MMHG

## 2024-09-16 RX ORDER — IBUPROFEN 800 MG/1
800 TABLET, FILM COATED ORAL EVERY 8 HOURS PRN
Qty: 30 TABLET | Refills: 3 | Status: SHIPPED | OUTPATIENT
Start: 2024-09-16

## 2024-09-16 NOTE — PLAN OF CARE
Problem: POSTPARTUM  Goal: Optimize infant feeding at the breast  Description: INTERVENTIONS:  - Initiate breast feeding within first hour after birth.   - Monitor effectiveness of current breast feeding efforts.  - Assess support systems available to mother/family.  - Identify cultural beliefs/practices regarding lactation, letdown techniques, maternal food preferences.  - Assess mother's knowledge and previous experience with breast feeding.  - Provide information as needed about early infant feeding cues (e.g., rooting, lip smacking, sucking fingers/hand) versus late cue of crying.  - Discuss/demonstrate breast feeding aids (e.g., infant sling, nursing footstool/pillows, and breast pumps).  - Encourage mother/other family members to express feelings/concerns, and actively listen.  - Educate father/SO about benefits of breast feeding and how to manage common lactation challenges.  - Recommend avoidance of specific medications or substances incompatible with breast feeding.  - Assess and monitor for signs of nipple pain/trauma.  - Instruct and provide assistance with proper latch.  - Review techniques for milk expression (breast pumping) and storage of breast milk. Provide pumping equipment/supplies, instructions and assistance, as needed.  - Encourage rooming-in and breast feeding on demand.  - Encourage skin-to-skin contact.  - Provide LC support as needed.  - Assess for and manage engorgement.  - Provide breast feeding education handouts and information on community breast feeding support.   Outcome: Completed  Goal: Experiences normal breast weaning course  Description: INTERVENTIONS:  - Assess for and manage engorgement.  - Instruct on breast care.  - Provide comfort measures.  Outcome: Completed  Goal: Appropriate maternal -  bonding  Description: INTERVENTIONS:  - Assess caregiver- interactions.  - Assess caregiver's emotional status and coping mechanisms.  - Encourage caregiver to  participate in  daily care.  - Assess support systems available to mother/family.  - Provide /case management support as needed.  Outcome: Completed

## 2024-09-16 NOTE — PROGRESS NOTES
All discharge instructions reviewed with Pt, ,  and mother, they verbalize understanding of all instructions. ID bands matched with infant.

## 2024-09-16 NOTE — PLAN OF CARE
Problem: POSTPARTUM  Goal: Optimize infant feeding at the breast  Description: INTERVENTIONS:  - Initiate breast feeding within first hour after birth.   - Monitor effectiveness of current breast feeding efforts.  - Assess support systems available to mother/family.  - Identify cultural beliefs/practices regarding lactation, letdown techniques, maternal food preferences.  - Assess mother's knowledge and previous experience with breast feeding.  - Provide information as needed about early infant feeding cues (e.g., rooting, lip smacking, sucking fingers/hand) versus late cue of crying.  - Discuss/demonstrate breast feeding aids (e.g., infant sling, nursing footstool/pillows, and breast pumps).  - Encourage mother/other family members to express feelings/concerns, and actively listen.  - Educate father/SO about benefits of breast feeding and how to manage common lactation challenges.  - Recommend avoidance of specific medications or substances incompatible with breast feeding.  - Assess and monitor for signs of nipple pain/trauma.  - Instruct and provide assistance with proper latch.  - Review techniques for milk expression (breast pumping) and storage of breast milk. Provide pumping equipment/supplies, instructions and assistance, as needed.  - Encourage rooming-in and breast feeding on demand.  - Encourage skin-to-skin contact.  - Provide LC support as needed.  - Assess for and manage engorgement.  - Provide breast feeding education handouts and information on community breast feeding support.   Outcome: Progressing  Goal: Experiences normal breast weaning course  Description: INTERVENTIONS:  - Assess for and manage engorgement.  - Instruct on breast care.  - Provide comfort measures.  Outcome: Progressing  Goal: Appropriate maternal -  bonding  Description: INTERVENTIONS:  - Assess caregiver- interactions.  - Assess caregiver's emotional status and coping mechanisms.  - Encourage caregiver to  participate in  daily care.  - Assess support systems available to mother/family.  - Provide /case management support as needed.  Outcome: Progressing

## 2024-09-18 ENCOUNTER — TELEPHONE (OUTPATIENT)
Dept: OBGYN UNIT | Facility: HOSPITAL | Age: 39
End: 2024-09-18

## 2024-09-18 NOTE — PROGRESS NOTES
Reviewed self and infant care w / mom, she verbalizes understanding of instructions reviewed. Encourage to follow up w/ MDs as directed and w/ questions/concerns. Possible tongue tied, seeing ped today and lac tomorrow, pumping occas now, enc to join ct and flyers sent on my chart

## 2024-09-19 ENCOUNTER — NURSE ONLY (OUTPATIENT)
Dept: LACTATION | Facility: HOSPITAL | Age: 39
End: 2024-09-19
Attending: OBSTETRICS & GYNECOLOGY
Payer: COMMERCIAL

## 2024-09-19 ENCOUNTER — LACTATION ENCOUNTER (OUTPATIENT)
Dept: LACTATION | Facility: HOSPITAL | Age: 39
End: 2024-09-19

## 2024-09-19 VITALS — TEMPERATURE: 99 F

## 2024-09-19 PROCEDURE — 99213 OFFICE O/P EST LOW 20 MIN: CPT

## 2024-09-19 NOTE — LACTATION NOTE
LACTATION NOTE - MOTHER      Evaluation Type: Outpatient Initial    Problems identified  Problems identified: Knowledge deficit;Milk supply not WNL  Milk supply not WNL: Reduced (potential);Delayed lactogenesis II  Problems Identified Other: Pt came to the [facility] with her spouse and infant for BF support. She scored 11/30 on her EPDS and stated she feels more overwhelmed since she has left the hospital. She interacted well with her infant, she has good family support. LAWRENCE reviewed resources available for support, but mother declined LO contact call at this time. Mother has a low milk supply and infant has suspected oral restrictions which may be contributing to her lower milk supply. LAWRENCE is recommending infant be seen by speech therapy and enc to discuss this with her pediatrician.    Maternal history  Maternal history: AMA;Anemia    Breastfeeding goal  Breastfeeding goal: To maintain breast milk feeding per patient goal    Maternal Assessment  Bilateral Breasts: Symmetrical;Dense  Bilateral Nipples: Everted;Colostrum easily expressed  Right Breast: Plugged duct (outer aspect of breast between 1:00 to 2:00 position)  Left Breast: Plugged duct (outer aspect of breast between 10:00 to 12:00 position)  Prior breastfeeding experience (comment below): Primip  Breastfeeding Assistance: Breast exam provided with permission;Hand expression provided with permission;Breastfeeding assistance provided with permission;Pumping assistance provided with permission    Pain assessment  Pain, additional: Pain location  Pain Location: Breasts  Pain scale comment: Mild discomfort in the outer quadrants of breasts where she may have a few plugged ducts which she reported has decreased in size    Guidelines for use of:  Breast pump type: Spectra  Current use of pump:: every 3 hours after BF  Suggested use of pump: Pump 8-12X/24hr  Reported pumping volumes (ml): 8-10  Post-feed pumped volume: 4  Other (comment): Assisted with  breast massage/HE. Pt is able to express drops of colostrum prior to latching infant. Assisted with guiding infant to a deep latch. Pt denied discomfort when infant had a deep latch, however she reports nipple soreness when infant slips to a shallow latch, but she is able to release infant's latch and guides infant to a deeper latch and nipple pain resolves. LC noted that pt's nipples are compressed after her baby has completed BF, however pt denies nipple soreness. Discussed plugged ducts. Pt has a few small marble sized plugged ducts in the outer aspect of each breast that she stated have been uncomfortable. She has been applying warm/cold packs to her breasts to relieve her discomfort. Pt reported that the plugged ducts have been decreasing in size. No redness or change in skin temperature over the area has been noticed. Reviewed homecare for breast engorgement, plugged ducts and warning s/s to call OB. Discussed ways to increase her milk supply including pumping q 2-3 hrs, using hands on pumping techniques, adding 1-2 cluster pumping sessions. Discussed pumping during the night time instead of triple feeding during the night in order to get more rest. Assisted with pumping using the hands on pumping technique, concentrating on massaging the upper outer aspects of her breasts that have the plugged areas. Enc to apply cold packs to this area to decrease swelling. Pt reported having a thyroid  problem when she was younger, but that it had resolved. Enc to contact OB doctor to have her thyroid level checked which may affect her milk supply.  Infant has suspected oral restrictions and LC recommended infant be seen by a speech therapist for an evaluation. Enc to f/u with LC a week after seeing ped/speech therapy. Call sooner as needed. Pt stated she will call to schedule a f/u LC OPV.

## 2024-09-20 NOTE — PATIENT INSTRUCTIONS
Aly weighed 5 lbs 14.5 oz today at 5 days old. He was sleepy with breastfeeding and transferred 8 ml of breast milk and was supplemented with 60 ml of formula    This week  Continue skin to skin care, feed your baby on demand and wake by 2-3 hours for feedings during the day and 3 hours during the night.   Try gentle breastfeeding attempts during the day for up to 20 minutes at first and then you may increase the amount of time and frequency of breastfeeding as your baby improves with the breastfeeding attempts. End the breastfeeding attempt if your baby becomes fussy,  place skin to skin to calm. Journal feedings, wet diapers and stools. Increase the amount of supplement if your baby is having less than 6-8 heavy wet diapers.  Continue to use your breast pump every 2-3 hours during the day and every 3-4 hours at night for 20 minutes.    I am recommending your baby be seen by a speech/myofunctional therapist for a sucking evaluation, and encourage you to call your pediatrician and discuss this recommendation.    Breastfeeding suggestions to increase milk supply    Here are some suggestions:    Kangaroo mother care: Snuggle with your baby in skin to skin contact.  This helps to wake a sleepy baby and increases your milk supply.     Massage your breasts before nursing or pumping.  Practice relaxation techniques like visual imagery.    Increase the frequency of feedings and/or pumping sessions.  Most babies will feed 8-12 times in 24 hours with some periods of clusterfeeding, especially during growth spurts or when you are working on increasing your supply.    Include night pumping sessions. Your hormone levels are higher at night.    Discuss thyroid level check of mother with your physician as this hormone is important for milk production.     Improve your baby's position and latch.  Positioning:   Your hand at neck/shoulders, not the back of head.   Line chin with the bottom of your areola  Latching on:  Express  drops of milk onto your baby's lips to encourage licking.  Point your nipple to baby's nose and stroke lightly down the center of lips.  Wait for wide mouth with tongue cupped at bottom of mouth.  Chin should be deep into breast, with some room between nose and the breast.   If needed, gently draw chin down lower to deepen latch.    Is baby taking enough breastmilk?  Swallowing with most sucks (every 1-3 sucks) until satisfied at least 8-12 times every 24 hours.  Compressing the breast when your baby sucks can increase milk flow.  At least 6-8 wet diapers and at least 3-4 soft, yellow seedy stools every 24 hours. Use breastfeeding journal.  Weight gain of at least 4-7 ounces per week    If supplements are needed:  Continue to pump both breasts for 20 minutes anytime a supplement is needed.  A hospital grade rental pump is recommended.  Consider the hands on pumping technique to help promote emptying your breast milk while you are pumping  Consider adding a cluster pumping session in the evening to help increase your milk supply    Breastfeeding Suggestions for Plugged Duct/Breast Infection (mastitis)    Prior to handling baby, your breasts, or breast pump, remember to wash hands with soap and water.    Breastfeed on demand, wake baby by        1 ½ to 3 hours to feed if sleepy.     Use cooling packs between feeds     Keep any breast massage light    Ask your wilbert care provider if they recommend taking Ibuprofen to decrease pain and or swelling  If plugged ducts reoccur ask your health care provider about supplementing with Sunflower Lecithin    Deep Latching on:  Try different positions, with baby's chin pointed toward the plugged area if possible  Express drops of milk onto your baby's lips to encourage licking.  Point your nipple to baby's nose  Stroke nipple lightly down center of lips  Wait for wide mouth with tongue cupped at bottom of mouth.  Chin should be deep into breast, with some room between nose and the  breast.   If needed, gently draw chin down lower to deepen latch.    Pump to soften the breast if still uncomfortably full after nursing or baby is uninterested in nursing.    Apply cold compresses for 10 minutes on your breast if needed to decrease breast swelling before and after nursing or pumping:     Wear a supportive bra that isn't too tight    Rest and drink plenty of fluids.    Call your OB doctor with any signs of   mastitis (breast infection)  Plugged area(s) are not soft within 24-48 hours.   Breast becomes firm, reddened, or painful.   Fever, chills, or flu-like symptoms. Check your temperature 3 times daily until a plugged duct or mastitis resolves.    If mastitis occurs:  Continue breastfeeding and/or pumping - your milk is not infected.   Continue above treatment for relieving plugged area(s)  Check with your doctor about taking ibuprofen for relief of discomfort.  Continue to take antibiotic as prescribed even though you may quickly feel better.   Contact your doctor is you are not feeling significantly better within 1-2 days of starting antibiotic, sooner if symptoms worsen.       Consider attending the Cradle Talk Program that meets each Wednesdays from 10:00-11:30 am   at [address] (across from [facility])     On the 1st and 3rd Wednesdays of the month a Breastfeeding Support Group meets after Cradle Talk starting at 11:00 am  On the 2nd and 4th Wednesdays of the month a Nurturing Moms Support Group meets after Cradle Talk starting at 11:00 am     If you are feeling anxious or depressed, support is available for you through the Mom's Line at [phone]) 110-2931 you will be able to get Immediate Assistance at this 24-hour Help Line     Follow-up:  Call lactation [phone] as needed.   Schedule a follow-up lactation consult within 1-2 weeks and as needed.  Call your pediatrician with any feeding difficulties or concerns.    Call your physicians  with concerns    For additional information:  www.llli.org  www.breastfeeding.org  [email or web address]

## 2024-09-20 NOTE — LACTATION NOTE
This note was copied from a baby's chart.  LACTATION NOTE - INFANT    Evaluation Type  Evaluation Type: Outpatient Initial    Problems & Assessment  Problems Diagnosed or Identified:  feeding problem;Tongue restriction;Sleepy  Problems: comment/detail: Aly weighed 6 lbs 3.5 oz at birth and 5 lbs 14.5 oz today at 5 days old.  Mother came to the [facility] with her spouse and infant for BF support. She scored 11/30 on her EPDS and stated she feels more overwhelmed since she has left the hospital. She interacted well with her infant, she has good family support. LAWRENCE reviewed resources available for support, but mother declined LO contact call at this time. Mother has a low milk supply and infant has suspected oral restrictions which may be contributing to her lower milk supply. LAWRENCE is recommending infant be seen by speech therapy and enc to discuss this with her pediatrician.  Infant Assessment: Anterior fontanel soft and flat;Abdomen soft, non-distended;Good skin turgor;Hunger cues present;Skin color: jaundice (oral mucous membranes slightly dry, peely skin, thick labial frenulum, Lingual frenulum attached close to the end of tongue-unable to extend tongue past gumline, white coating on the tongue)  Muscle tone: Appropriate for GA    Feeding Assessment  Summary Current Feeding: Breastfeeding with breast milk supplement;Breastfeeding with formula supplement  Last 24 hour feeding summary: BF  Breastfeeding Assessment: Assisted with breastfeeding w/mother's permission;Deep latch achieved and observed;Sleepy infant, quickly pacifies;Sustained nutrititive latch w/audible swallows;PO supplement followed breastfeeding  Breastfeeding lasted # of minutes: 20  Breastfeeding Positions: right breast;left breast;cross cradle  Latch: Grasps breast, tongue down, lips flanged, rhythmic sucking  Audible Sucks/Swallows: A few with stimulation  Type of Nipple: Everted (after stimulation)  Comfort (Breast/Nipple):  Soft/non-tender  Hold (Positioning): Full assist, teach one side, mother does other, staff holds  LATCH Score: 8  Other (comment): Assisted with guiding infant to a deep latch, infant was able to achieve a deep latch, but was sleepy and needed gentle stimulation to continue actively sucking. Infant transferred 8 ml while at the breast. Discussed sleepy behaviors of infant at the breast and the importance of waking by 2-3 hrs for feedings. FOB bottle fed infant an additional 60 ml of formula. Discussed paced bottle feeding techniques to slow flow of milk, burping frequently and keeping infant upright after feedings. Reviewed a temporary feeding plan for this week-continue skin to skin care, feed infant on demand and wake by 2-3 hrs for feedings. Discussed BF infant 1st during the day, keeping BF short up to 20 minutes so infant doesn't become too fatigued and increase the supplement after BF to 1+ oz EBM/formula to infant satiety. During the night plan to wake infant by 3 hrs for feedings-bottle feed infant with 1+oz EBM/formula while mother uses her breast pump. This plan will help mother get more rest at night time, but still provide stimulation to help increase her milk supply. Enc to journal I&O and increase supplements if infant is not having adequate I&O. Call pediatrician if infant is having feeding difficulties or inadequate I&O. Discussed infant may have oral restrictions. A handout on tongue tie was given to parents and discussed resources for Speech therapy to evaluate to evaluate infant. Parents stated that they have a ped appt on 9/25 and will possibly call and schedule an appt with a Speech therapist for next week. Plan to call for a follow up LC OPV within 2 weeks. Enc to call sooner or schedule an LC OPV sooner prn.    Output  # Voids in 24 hours: 4  # Stools in 24 hours: 2    Pre/Post Weights  Pre-Weight Right Breast (g): 2678  Post-Weight Right Breast (g): 2682  ml of milk, RT Brst: 4  Pre-Weight  Left Breast (g): 2682  Post-Weight Left Breast (g): 2686  ml of milk, LT Brst: 4  ml of milk, total: 8  Supplement Type: Formula  Supplement total, ml: 60  Feeding total ml: 68

## (undated) NOTE — LETTER
May 2, 2024      Dolly Jauregui MD  [address]  [facility] [address]  Via Fax: [phone]  Patient: Maryam Medrano  : 10/8/1985    Dear Colleague:  Thank you for referring your patient to me for a Maternal Fetal Medicine evaluation. Please see my attached note for my findings and recommendations.      Should you have any questions or concerns, please do not hesitate to contact me at the number listed below.    Best Regards,      Yovanny Feliciano MD  [facility]   [address]  [facility] [address] [phone]    cc: No Recipients      [facility] Department of Maternal Fetal Medicine  Patient Name: Maryam Medrano  Patient : 10/8/1985  Physician: Yovanny Feliciano MD    Outpatient Maternal-Fetal Medicine Consultation    Dear Dr. Jauregui    Thank you for requesting ultrasound evaluation and maternal fetal medicine consultation on your patient Maryam Medrano.  As you are aware she is a 38 year old female  with a singletonpregnancy and an Estimated Date of Delivery: 2024.  A maternal-fetal medicine consultation was requested secondary to advanced maternal age.  Her prenatal records and labs were reviewed.    HISTORY  Past OB History  G1 P 0000    Past Medical History  unremarkable    Past Surgical History   - appendectomy    Family History  Noncontributory    Medications: PNV, fish Oil, Vit D  Allergies: NKDA    PHYSICAL EXAMINATION:  /74 (BP Location: Right arm, Patient Position: Sitting, Cuff Size: adult)   Pulse 89   Ht 5' 4\" (1.626 m)   Wt 136 lb (61.7 kg)   BMI 23.34 kg/m²   General: alert and oriented,no acute distress  Abdomen: gravid, soft, non-tender  Extremities: non-tender, no edema    OBSTETRIC ULTRASOUND    Ultrasound Findings:  Single IUP in cephalic presentation.    Placenta is anterior.   A 3 vessel cord is noted.  Cardiac activity is present at 143 bpm   g ( 0 lb 12 oz);   MVP is 5.5 cm .   Left  Pyelectasis  The fetal measurements are consistent with the established EDC. No ultrasound evidence of structural abnormalities are seen today. The nasal bone is present. No ultrasound evidence of markers for aneuploidy are seen. She understands that ultrasound exam cannot exclude genetic abnormalities and that genetic testing is recommended. The limitations of ultrasound were discussed.     Uterus and adnexa appeared normal  today on US    See PACS/Imaging Tab For Complete Ultrasound Report  I interpreted the results and reviewed them with the patient.    DISCUSSION  During her visit we discussed and reviewed the following issues:  PYELECTASIS  Pyelectasis is defined as an abnormal dilation of the renal pelvis in the sabina-posterior diameter.  The following values have been used to define pyelectasis:     >4mm at 15-26weeks  >7mm at >26 weeks    The incidence of pyelectasis in the general population is 2-3%.  The relationship between isolated pyelectasis and aneuploidy is unclear.  Anival (1992) gave a risk of aneuploidy of 1 in 340.  In patients with other risk factors, genetic testing is offered.  A detailed ultrasound is recommended in all cases.     A follow-up scan is advised in the third trimester (~ 32 weeks) to reassess the fetal  anatomy; specifically to rule out progressive renal pelvis dilatation.  Ureteral reflux is a common cause of pathologic renal pelvis dilatation; with more significant renal pelvis dilatation structural genitourinary abnormalities are more likely.  If progressive renal pelvis dilatation is noted at the follow-up scan, post-celestino pediatric urology follow-up will be advised.    ADVANCED MATERNAL AGE    Background  I reviewed with the patient that pregnancies in women of advanced maternal age (35 or older at delivery) are associated with elevated risks.  Specifically, there is a higher rate of:    Fetal malformations  Preeclampsia  Gestational diabetes  Intrauterine fetal  death    As a result, enhanced pregnancy surveillance is advised for these patients including a comprehensive ultrasound to assess for fetal malformations  (at 20 weeks) and a third trimester ultrasound assessment for fetal growth (at 32 weeks).  In addition, weekly NST's (initiating at 36 weeks gestation for women 35-39 years and at 32 weeks gestation for women 40 years and older) are also advised.  Routine obstetric care is more than adequate to assess for gestational diabetes and preeclampsia; hence, no further significant alterations in obstetric care are advised.    Fetal Aneuploidy    We also discussed the increased risk of chromosomal abnormalities associated with advanced maternal age.  I reviewed that an ultrasound examination cannot reliably exclude potential genetic abnormalities. Given that the patient will be 38 years old at the time of delivery I reviewed that her risk (at delivery) of having a  with any chromosome abnormality is 1:83 and with trisomy 21 is 1: 136.    Invasive Testing    I offered invasive genetic testing (amniocentesis, chorionic villus sampling) after reviewing the diagnostic accuracy of these tests as well as the procedure associated loss rate (1:500 for genetic amniocentesis).    She ultimately does not desire invasive genetic testing.     Non-invasive Pregnancy Testing (NIPT)    I reviewed current non-invasive screening options.  Currently non-invasive pregnancy testing (NIPT) offers the highest detection rate (with the lowest false positive rate) for the detection of fetal aneuploidy amongst high-risk patients.  The limitations of detailed mid-trimester sonography was reviewed with the patient.  First trimester screening and second trimester multiple-marker serum serum screening as alternative aneuploidy screening options were also reviewed.  However, both of these tests are associated with lower detection and higher false positive rates.    The patient has already  obtained a low-risk  NPIT result and was appropriately reassured.     IMPRESSION:  IUP at 20w1d  AMA: low-risk cell free fetal DNA, declined invasive testing  Left Pyelectasis - mild 5 mm    RECOMMENDATIONS:  Continue care with Dr. Jauregui  Follow-up growth & BPP ultrasound at 32 weeks.  Weekly NST's at 36 weeks.    Thank you for allowing me to participate in the care of your patient.  Please do not hesitate to contact me if additional questions or concerns arise.      Yovanny Feliciano M.D.    40 minutes spent in review of records, patient consultation, documentation and coordination of care.  The relevant clinical matter(s) are summarized above.     Note to patient and family  The 21st Century Cures Act makes medical notes available to patients in the interest of transparency.  However, please be advised that this is a medical document.  It is intended as [id number] communication.  It is written and medical language may contain abbreviations or verbiage that are technical and unfamiliar.  It may appear blunt or direct.  Medical documents are intended to carry relevant information, facts as evident, and the clinical opinion of the practitioner.    Pt here for level II ultrasound/doctor consult  + flutters  No complaints

## (undated) NOTE — LETTER
2024      Jey Wells, DO  [address]  [facility] [address]  Via In Basket  Patient: Maryam Medrano  : 10/8/1985    Dear Colleague:  Thank you for referring your patient to me for a Maternal Fetal Medicine evaluation. Please see my attached note for my findings and recommendations.      Should you have any questions or concerns, please do not hesitate to contact me at the number listed below.    Best Regards,      Angella Pereira MD  [facility]  [address]  [facility] [address]  [phone]    cc: No Recipients      [facility] Department of Maternal Fetal Medicine  Patient Name: Maryam Medrano  Patient : 10/8/1985  Physician: Angella Pereira MD    Pt here for growth US. +FM, no complaints today.    Outpatient Maternal-Fetal Medicine Follow-Up     Dear Dr. Wells,     Thank you for requesting ultrasound evaluation and maternal fetal medicine consultation on your patient Maryam Medrano.  As you are aware she is a 38 year old female  with a Gordillo pregnancy and an Estimated Date of Delivery: 24.  She returned to maternal-fetal medicine today for a follow-up visit.  Her history was reviewed from her prior visit and there were no interval changes.     Antepartum Risk Factors  Advanced maternal age, primigravida  Pyelectasis noted at her level 2 ultrasound -resolved as of 2024      S: She reports good fetal movement.  She reports that she had screening  PHYSICAL EXAMINATION:  /69 (BP Location: Right arm, Patient Position: Sitting, Cuff Size: adult)   Pulse 113   Ht 5' 4\" (1.626 m)   Wt 155 lb (70.3 kg)   BMI 26.61 kg/m²   General: alert and oriented, no acute distress  Abdomen: gravid, soft, non-tender  Extremities: non-tender, no edema     OBSTETRIC ULTRASOUND  The patient had a fetal growth and BPP ultrasound today which I interpreted the results and reviewed them with the patient.    Ultrasound Findings:  Single IUP  in cephalic presentation.    Placenta is posterior.   Cardiac activity is present at 139 bpm  EFW 1917 g ( 4 lb 4 oz); 44%.    MARIO is  12.6 cm.  MVP is 8.7 cm  BPP is 8/8.     Previously seen right pyelectasis has resolved.     The fetal measurements are consistent with established EDC. No gross ultrasound evidence of structural abnormalities are seen today. The patient understands that ultrasound cannot rule out all structural and chromosomal abnormalities.     See imaging tab for the complete US report.     DISCUSSION  During her visit we discussed and reviewed the following issues:  PYELECTASIS  Pyelectasis is defined as an abnormal dilation of the renal pelvis in the sabina-posterior diameter.  The following values have been used to define pyelectasis:      >4mm at 15-26weeks  >7mm at >26 weeks     The fetal kidneys appear normal today.  No additional surveillance is needed     ADVANCED MATERNAL AGE  Discussed previously and reviewed.  See [facility] note from 2024 for detailed review  I reviewed with the patient that pregnancies in women of advanced maternal age (35 or older at delivery) are associated with elevated risks.  Specifically, there is a higher rate of:     Fetal malformations  Preeclampsia  Gestational diabetes  Intrauterine fetal death     As a result, enhanced pregnancy surveillance is advised for these patients including a comprehensive ultrasound to assess for fetal malformations  (at 20 weeks) and a third trimester ultrasound assessment for fetal growth (at 32 weeks).  In addition, weekly NST's (initiating at 36 weeks gestation for women 35-39 years and at 32 weeks gestation for women 40 years and older) are also advised.  Routine obstetric care is more than adequate to assess for gestational diabetes and preeclampsia; hence, no further significant alterations in obstetric care are advised.    We reviewed the signs and symptoms of preeclampsia,  labor and monitoring fetal movement.  We  discussed reasons for her to call her physician.       IMPRESSION:  IUP at 32w2d  Normal fetal growth and  testing  Prior finding of fetal pyelectasis has resolved  AMA: low-risk cell free fetal DNA, declined invasive testing       RECOMMENDATIONS:  Continue care with Dr. Wells  Weekly NST's at 36 weeks.        Total time spent 30 minutes this calendar day which includes preparing to see the patient including chart review, obtaining and/or reviewing additional medical history, performing a physical exam and evaluation, documenting clinical information in the electronic medical record, independently interpreting results, counseling the patient, communicating results to the patient/family/caregiver and coordinating care.     Case discussed with patient who demonstrated understanding and agreement with plan.     Thank you for allowing me to participate in the care of this patient.  Please feel free to contact me with any questions.    Angella Pereira MD  Maternal-Fetal Medicine       Note to patient and family:  The 21st Century Cures Act makes medical notes available to patients in the interest of transparency.  However, please be advised that this is a medical document.  It is intended as a peer to peer communication.  It is written in medical language and may contain abbreviations or verbiage that are technical and unfamiliar.  It may appear blunt or direct.  Medical documents are intended to carry relevant information, facts as evident, and the clinical opinion of the practitioner.